# Patient Record
(demographics unavailable — no encounter records)

---

## 2020-01-07 NOTE — NUR
Patient discharged with v/s stable. Written and verbal after care instructions 
given and explained. 

Patient alert, oriented and verbalized understanding of instructions. 
Ambulatory with steady gait. All questions addressed prior to discharge. ID 
band removed. Patient advised to follow up with PMD. Rx of IBUPROFEN AND KEFLEX 
given. Patient educated on indication of medication including possible reaction 
and side effects. Opportunity to ask questions provided and answered. TOLD PT 
TO COME BACK RECHECK IN 7 DAYS.
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
## 2020-01-23 NOTE — NUR
DISCHARGE DONE BY DR MERIDA. Patient discharged with v/s stable. Written and 
verbal after care instructions ABOUT UPPER RESPIRATORY INFECTIONS given and 
explained. 

Patient alert, oriented and verbalized understanding of instructions. 
Ambulatory with steady gait. All questions addressed prior to discharge. ID 
band removed. Patient advised to follow up with PMD. Rx of AUGMENTIN given. 
Patient educated on indication of medication including possible reaction and 
side effects. Opportunity to ask questions provided and answered.

## 2020-01-23 NOTE — NUR
32 Y/O FEVER AND COUGH X2 DAYS. TYLENOL TAKEN 1 HOUR PTA. PATIENT STATES HIS 
MOTHER CAME BACK FROM [place] ON MONDAY AND HAD COLD LIKE SYMPTOMS. ON TUESDAY, 
HE STARTED EXHIBITING THE SAME SYMPTOMS. HE WAS CONCERNED BECAUSE HE HEARD OF 
THE CORONA VIRUS IN THE NEWS. LUNG SOUNDS CLEAR/DIMINISHED THROUGHOUT; 
BREATHING UNLABORED AND SYMMETRICAL.  PATIENT STATES HE HAS A DRY COUGH. 
ABDOMEN SOFT AND ROUNDl BOWEL SOUNDS HEARD THROUGHOUT. DENIES N/V/D. 4/10 
GENERALIZED PAIN AND MALAISE NOTED. ERMD MADE AWARE OF STATUS. SIDE RAILSX1. 
WILL CONTINUE TO MONITOR. NO FEVER NOTED AT THIS TIME. 



MEDHX- NONE

NKA

RX:DENIES